# Patient Record
Sex: FEMALE | Race: BLACK OR AFRICAN AMERICAN | Employment: FULL TIME | URBAN - METROPOLITAN AREA
[De-identification: names, ages, dates, MRNs, and addresses within clinical notes are randomized per-mention and may not be internally consistent; named-entity substitution may affect disease eponyms.]

---

## 2017-02-03 ENCOUNTER — DOCUMENTATION ONLY (OUTPATIENT)
Dept: RHEUMATOLOGY | Age: 57
End: 2017-02-03

## 2017-02-03 ENCOUNTER — TELEPHONE (OUTPATIENT)
Dept: RHEUMATOLOGY | Age: 57
End: 2017-02-03

## 2017-02-03 NOTE — TELEPHONE ENCOUNTER
Returned call to patient who has been c/o right hip pain x's 1  week. The patient is using diclofenac gel and [employer] with no little relief. Last office visit: 11/11/16.

## 2017-02-06 ENCOUNTER — OFFICE VISIT (OUTPATIENT)
Dept: RHEUMATOLOGY | Age: 57
End: 2017-02-06

## 2017-02-06 VITALS
TEMPERATURE: 98.3 F | BODY MASS INDEX: 36 KG/M2 | RESPIRATION RATE: 16 BRPM | DIASTOLIC BLOOD PRESSURE: 86 MMHG | OXYGEN SATURATION: 98 % | HEART RATE: 63 BPM | SYSTOLIC BLOOD PRESSURE: 140 MMHG | HEIGHT: 67 IN | WEIGHT: 229.4 LBS

## 2017-02-06 DIAGNOSIS — Z79.1 LONG TERM (CURRENT) USE OF NON-STEROIDAL ANTI-INFLAMMATORIES (NSAID): ICD-10-CM

## 2017-02-06 DIAGNOSIS — M16.11 PRIMARY OSTEOARTHRITIS OF RIGHT HIP: Primary | ICD-10-CM

## 2017-02-06 RX ORDER — SULINDAC 200 MG/1
TABLET ORAL
Qty: 60 TAB | Refills: 2 | Status: SHIPPED | OUTPATIENT
Start: 2017-02-06 | End: 2017-08-16 | Stop reason: ALTCHOICE

## 2017-02-06 NOTE — PROGRESS NOTES
HISTORY OF PRESENT ILLNESS  Mick Singh is a 55 y.o. female. HPI Patient presents for evaluation of hip pain. She notes that right hip pain is worsening. She did see Dr. Shanice Donohue in December, and he recommended hip replacement. She had seen a chiropractor for topical \"laser therapy:\" 2 visits may have helped, though she wants more of a \"permanent solution. \" She is scheduled to see physicians regarding \"stem cell therapy\" on February 28th. She notes posterior, right hip region pain, now even bothering her at night. She has AM stiffness of 2 hours. She relates pain increases with walking (perhaps 1/2 mile with using a cane). She takes diclofenac 75 mg twice daily with some relief, if taking BC powders TID. An OTC TENS unit gives some relief. Current Outpatient Prescriptions   Medication Sig Dispense Refill    diclofenac 75 mg take 75 mg BID as needed for pain 60 Tab 2    lidocaine (LIDODERM) 5 % by TransDERmal route every twenty-four (24) hours. Apply patch to the affected area for 12 hours a day and remove for 12 hours a day.  diclofenac (VOLTAREN) 1 % gel Apply  to affected area four (4) times daily.  Cetirizine (ZYRTEC) 10 mg cap Take  by mouth.  GINGER ROOT (GINGER, ZINGIBER OFFICINALIS,) 550 mg cap Take  by mouth.  LACTOBACILLUS ACIDOPHILUS (PROBIOTIC PO) Take  by mouth.  glucosamine-chondroitin (ARTHX) 500-400 mg cap Take 1 Cap by mouth daily.  cholecalciferol, VITAMIN D3, (VITAMIN D3) 5,000 unit tab tablet Take  by mouth daily.  FLAXSEED OIL PO Take  by mouth.  CALCIUM PO Take 500 mg by mouth daily.  albuterol (PROVENTIL HFA, VENTOLIN HFA, PROAIR HFA) 90 mcg/actuation inhaler Take 2 Puffs by inhalation every four (4) hours as needed for Wheezing. Indications: Acute Asthma Attack 1 Inhaler 12    azelastine-fluticasone 137-50 mcg/spray spry by Nasal route. Review of Systems   Constitutional: Negative for fever.    Cardiovascular: Negative for leg swelling. Gastrointestinal: Negative for abdominal pain and nausea. Musculoskeletal: Positive for joint pain. Negative for falls. Skin: Negative for rash. Physical Exam   Vitals reviewed. Constitutional: She is oriented to person, place, and time. She appears well-developed and well-nourished. No distress. Cardiovascular:   no edema   Abdominal: Soft. She exhibits no distension. There is no tenderness. Musculoskeletal:   -right hip severely limited and painful IR  -left hip and knees FROM  -No dorsal lumbar spine or paraspinal tenderness  Neurological: She is alert and oriented to person, place, and time. She exhibits normal muscle tone.   -gait limping, favoring right leg  Skin: Skin is warm and dry. Psychiatric: She has a normal mood and affect. Thought content normal.     ASSESSMENT and PLAN    ICD-10-CM ICD-9-CM    1. Primary osteoarthritis of right hip: worsening hip pain over about 3 years. Significant OA on radiograph. Walking progressively limited by pain. She is waking up at night now due to pain. Diclofenac of limited benefit. She has seen Dr. Jasmine Buchanan, who had recommended hip replacement. M16.11 715.15 -she is very leery of hip replacement. We discussed her concerns. We discussed the importance of finding a therapy that allowed for improvement in functioning and decreased pain.  -regarding upcoming PRPP/stem cell therapy appointment, I asked that she forward information from the appointment and any available data for my review  -TENS has been helpful for some of pain. She will check with PT to see if another visit is needed to evaluate response for pain and make recommendations (I can subsequently order TENS if found helpful for discomfort).  Consider evaluating gait with PT as well  -sulindac 200 mg BID as needed instead of diclofenac or other NSAIDS  -after upcoming evaluation, consider hip injection if pain not controlled (and no pending surgery)  -discussed trial of medical food Limbrel 500 mg BID. Discussed limited data regarding efficacy. Discussed potential adverse effects. If no improvement after 4 week trial, stop taking this. 2. Long term (current) use of non-steroidal anti-inflammatories (nsaid) Z79.1 V58.64 -reviewed potential adverse effects  -omeprazole 20 mg daily if regular NSAIDS  -CBC and CMP 4-6 weeks after sulindac (and Limbrel, if added)     A total 30+ minutes was spent with the patient of which greater than 50% of the time was spent counseling/ coordinating care regarding current hip pain, concerns about surgery, reviewing other options for relief of pain; discussing upcoming evaluation for PRPP/ stem cell therapy.

## 2017-02-06 NOTE — MR AVS SNAPSHOT
Visit Information Date & Time Provider Department Dept. Phone Encounter #  
 2/6/2017  2:00 PM Maria E Abad MD [facility] [id number] Follow-up Instructions Return in about 6 weeks (around 3/20/2017). Upcoming Health Maintenance Date Due Pneumococcal 19-64 Medium Risk (1 of 1 - PPSV23) 10/1/1980 DTaP/Tdap/Td series (1 - Tdap) 10/1/1982 FOBT Q 1 YEAR AGE 50-75 10/1/2011 BREAST CANCER SCRN MAMMOGRAM 12/13/2018 PAP AKA CERVICAL CYTOLOGY 11/18/2019 Allergies as of 2/6/2017  Review Complete On: 2/6/2017 By: Maria E Abad MD  
  
 Severity Noted Reaction Type Reactions Codeine  04/04/2016    Nausea Only Shellfish Derived  04/04/2016    Unknown (comments) Throat swells a little Current Immunizations  Reviewed on 2/6/2017 Name Date Influenza Vaccine Ahmet Rodney) 10/17/2016 Reviewed by Bienvenido Perrin LPN on 5/4/3790 at  0:44 PM  
 Reviewed by Bienvenido Perrin LPN on 9/6/8820 at  5:48 PM  
You Were Diagnosed With   
  
 Codes Comments Primary osteoarthritis of right hip    -  Primary ICD-10-CM: M16.11 
ICD-9-CM: 715.15 Long term (current) use of non-steroidal anti-inflammatories (nsaid)     ICD-10-CM: Z79.1 ICD-9-CM: V58.64 Vitals BP Pulse Temp Resp Height(growth percentile) Weight(growth percentile) 140/86 (BP 1 Location: Right arm, BP Patient Position: Sitting) 63 98.3 °F (36.8 °C) (Oral) 16 5' 6.5\" (1.689 m) 229 lb 6.4 oz (104.1 kg) SpO2 BMI OB Status Smoking Status 98% 36.47 kg/m2 Menopause Never Smoker BMI and BSA Data Body Mass Index Body Surface Area  
 36.47 kg/m 2 2.21 m 2 Preferred Pharmacy Pharmacy Name Phone [facility], [address] [place] [address]. [phone] Your Updated Medication List  
  
   
This list is accurate as of: 2/6/17  3:22 PM.  Always use your most recent med list.  
  
  
  
  
 albuterol 90 mcg/actuation inhaler Commonly known as:  PROVENTIL HFA, VENTOLIN HFA, PROAIR HFA Take 2 Puffs by inhalation every four (4) hours as needed for Wheezing. Indications: Acute Asthma Attack  
  
 azelastine-fluticasone 137-50 mcg/spray [place]  
by Nasal route. baicalin-catechin 500 mg Cap Commonly known as:  Elgie [place] Take 500 mg by mouth two (2) times a day. CALCIUM PO Take 500 mg by mouth daily. cholecalciferol (VITAMIN D3) 5,000 unit Tab tablet Commonly known as:  VITAMIN D3 Take  by mouth daily. FLAXSEED OIL PO Take  by mouth.  
  
 ginger (Zingiber officinalis) 550 mg Cap Take  by mouth. glucosamine-chondroitin 500-400 mg Cap Commonly known as:  [address] [place] [address] Take 1 Cap by mouth daily. lidocaine 5 % Commonly known as:  LIDODERM  
by TransDERmal route every twenty-four (24) hours. Apply patch to the affected area for 12 hours a day and remove for 12 hours a day. PROBIOTIC PO Take  by mouth.  
  
 sulindac 200 mg tablet Commonly known as:  CLINORIL Instead of diclofenac or other NSAIDS; take 200 mg BID as needed for pain VOLTAREN 1 % Gel Generic drug:  diclofenac Apply  to affected area four (4) times daily. ZyrTEC 10 mg Cap Generic drug:  Cetirizine Take  by mouth. Prescriptions Sent to Pharmacy Refills  
 sulindac (CLINORIL) 200 mg tablet 2 Sig: Instead of diclofenac or other NSAIDS; take 200 mg BID as needed for pain  
 Class: Normal  
 Pharmacy: [address] Ph #: [phone]  
 baicalin-catechin (LIMBREL) 500 mg cap 5 Sig: Take 500 mg by mouth two (2) times a day. Class: Normal  
 Pharmacy: [address] Ph #: [phone] Route: Oral  
  
We Performed the Following REFERRAL TO PHYSICAL THERAPY [WYI85 Custom] Comments: Assist with hip pain secondary to OA and abnormal gait. TENS if needed (please provide  Information regarding purchase of TENS if helpful) Follow-up Instructions Return in about 6 weeks (around 3/20/2017). Referral Information Referral ID Referred By Referred To  
  
 [id number] Yenny Dawson Not Available Visits Status Start Date End Date 12 New Request 2/6/17 2/6/18 If your referral has a status of pending review or denied, additional information will be sent to support the outcome of this decision. Patient Instructions Sulindac twice daily if needed for pain (not with diclofenac or BC's) Omeprazole 20 mg daily for stomach if persistent sulindac use Limbrel twice daily for 4 weeks (stop if no better) Introducing [facility] SERVICES! Rolo Crowley introduces [employer] patient portal. Now you can access parts of your medical record, email your doctor's office, and request medication refills online. 1. In your internet browser, go to [email or web address] 2. Click on the First Time User? Click Here link in the Sign In box. You will see the New Member Sign Up page. 3. Enter your Mahalo Access Code exactly as it appears below. You will not need to use this code after youve completed the sign-up process. If you do not sign up before the expiration date, you must request a new code. · Mahalo Access Code: [email or web address] Expires: 2/9/2017 10:13 AM 
 
4. Enter the last four digits of your Social Security Number (xxxx) and Date of Birth (mm/dd/yyyy) as indicated and click Submit. You will be taken to the next sign-up page. 5. Create a Mahalo ID. This will be your Mahalo login ID and cannot be changed, so think of one that is secure and easy to remember. 6. Create a Mahalo password. You can change your password at any time. 7. Enter your Password Reset Question and Answer. This can be used at a later time if you forget your password. 8. Enter your e-mail address. You will receive e-mail notification when new information is available in [address] 9. Click Sign Up. You can now view and download portions of your medical record. 10. Click the Download Summary menu link to download a portable copy of your medical information. If you have questions, please visit the Frequently Asked Questions section of the [employer] website. Remember, [employer] is NOT to be used for urgent needs. For medical emergencies, dial 911. Now available from your iPhone and Android! Please provide this summary of care documentation to your next provider. Your primary care clinician is listed as Nasrin Sanchez. If you have any questions after today's visit, please call [phone].

## 2017-02-06 NOTE — PATIENT INSTRUCTIONS
Sulindac twice daily if needed for pain (not with diclofenac or BC's)    Omeprazole 20 mg daily for stomach if persistent sulindac use  Limbrel twice daily for 4 weeks (stop if no better)

## 2017-02-17 ENCOUNTER — OFFICE VISIT (OUTPATIENT)
Dept: FAMILY MEDICINE CLINIC | Age: 57
End: 2017-02-17

## 2017-02-17 VITALS
DIASTOLIC BLOOD PRESSURE: 80 MMHG | BODY MASS INDEX: 35.58 KG/M2 | SYSTOLIC BLOOD PRESSURE: 122 MMHG | RESPIRATION RATE: 20 BRPM | HEART RATE: 77 BPM | HEIGHT: 67 IN | TEMPERATURE: 97.5 F | OXYGEN SATURATION: 96 % | WEIGHT: 226.7 LBS

## 2017-02-17 DIAGNOSIS — N64.89 BREAST ASYMMETRY: Primary | ICD-10-CM

## 2017-02-17 NOTE — PATIENT INSTRUCTIONS
Breast Self-Exam: Care Instructions  Your Care Instructions  A breast self-exam is when you check your breasts for lumps or changes. This regular exam helps you learn how your breasts normally look and feel. Most breast problems or changes are not because of cancer. Breast self-exam is not a substitute for a mammogram. Having regular breast exams by your doctor and regular mammograms improve your chances of finding any problems with your breasts. Some women set a time each month to do a step-by-step breast self-exam. Other women like a less formal system. They might look at their breasts as they brush their teeth, or feel their breasts once in a while in the shower. If you notice a change in your breast, tell your doctor. Follow-up care is a key part of your treatment and safety. Be sure to make and go to all appointments, and call your doctor if you are having problems. Its also a good idea to know your test results and keep a list of the medicines you take. How do you do a breast self-exam?  · The best time to examine your breasts is usually one week after your menstrual period begins. Your breasts should not be tender then. If you do not have periods, you might do your exam on a day of the month that is easy to remember. · To examine your breasts:  ¨ Remove all your clothes above the waist and lie down. When you are lying down, your breast tissue spreads evenly over your chest wall, which makes it easier to feel all your breast tissue. ¨ Use the padsnot the fingertipsof the 3 middle fingers of your left hand to check your right breast. Move your fingers slowly in small coin-sized circles that overlap. ¨ Use three levels of pressure to feel of all your breast tissue. Use light pressure to feel the tissue close to the skin surface. Use medium pressure to feel a little deeper. Use firm pressure to feel your tissue close to your breastbone and ribs.  Use each pressure level to feel your breast tissue before moving on to the next spot. ¨ Check your entire breast, moving up and down as if following a strip from the collarbone to the bra line, and from the armpit to the ribs. Repeat until you have covered the entire breast.  ¨ Repeat this procedure for your left breast, using the pads of the 3 middle fingers of your right hand. · To examine your breasts while in the shower:  ¨ Place one arm over your head and lightly soap your breast on that side. ¨ Using the pads of your fingers, gently move your hand over your breast (in the strip pattern described above), feeling carefully for any lumps or changes. ¨ Repeat for the other breast.  · Have your doctor inspect anything you notice to see if you need further testing. Where can you learn more? Go to http://wen-clau.info/. Enter P148 in the search box to learn more about \"Breast Self-Exam: Care Instructions. \"  Current as of: July 26, 2016  Content Version: 11.1  © 9191-6246 [employer]. Care instructions adapted under license by [employer] (which disclaims liability or warranty for this information). If you have questions about a medical condition or this instruction, always ask your healthcare professional. [place] [address] any warranty or liability for your use of this information.

## 2017-02-17 NOTE — PROGRESS NOTES
1. Have you been to the ER, urgent care clinic since your last visit? Hospitalized since your last visit? No    2. Have you seen or consulted any other health care providers outside of the [address] [place] [address] since your last visit? Include any pap smears or colon screening.  No     Pt states she is here due to swelling of her left breast

## 2017-02-17 NOTE — PROGRESS NOTES
Chief Complaint   Patient presents with    Other     swollen left breast     HPI:  Shabnam Garcia is a 55 y.o. AA female presenting with complaints of swollen left breast.  Patient noted left breast was larger then right, it does not hurt, it is not red/warm. Denies fever/chills. Mammogram in 12/2016 was normal    Review of Systems  As per hpi    Past Medical History   Diagnosis Date    Anemia     Arthritis      Past Surgical History   Procedure Laterality Date    Hx refractive surgery  2006     Social History     Social History    Marital status: SINGLE     Spouse name: N/A    Number of children: N/A    Years of education: N/A     Social History Main Topics    Smoking status: Never Smoker    Smokeless tobacco: Never Used    Alcohol use 0.6 oz/week     0 Standard drinks or equivalent, 1 Glasses of wine per week    Drug use: No    Sexual activity: No     Other Topics Concern    None     Social History Narrative     Current Outpatient Prescriptions   Medication Sig Dispense Refill    sulindac (CLINORIL) 200 mg tablet Instead of diclofenac or other NSAIDS; take 200 mg BID as needed for pain 60 Tab 2    albuterol (PROVENTIL HFA, VENTOLIN HFA, PROAIR HFA) 90 mcg/actuation inhaler Take 2 Puffs by inhalation every four (4) hours as needed for Wheezing. Indications: Acute Asthma Attack 1 Inhaler 12    lidocaine (LIDODERM) 5 % by TransDERmal route every twenty-four (24) hours. Apply patch to the affected area for 12 hours a day and remove for 12 hours a day.  Cetirizine (ZYRTEC) 10 mg cap Take  by mouth.  GINGER ROOT (GINGER, ZINGIBER OFFICINALIS,) 550 mg cap Take  by mouth.  LACTOBACILLUS ACIDOPHILUS (PROBIOTIC PO) Take  by mouth.  glucosamine-chondroitin (ARTHX) 500-400 mg cap Take 1 Cap by mouth daily.  cholecalciferol, VITAMIN D3, (VITAMIN D3) 5,000 unit tab tablet Take  by mouth daily.  FLAXSEED OIL PO Take  by mouth.  CALCIUM PO Take 500 mg by mouth daily. Allergies   Allergen Reactions    Codeine Nausea Only    Shellfish Derived Unknown (comments)     Throat swells a little     Objective:  Visit Vitals    /80 (BP 1 Location: Right arm, BP Patient Position: Sitting)    Pulse 77    Temp 97.5 °F (36.4 °C) (Oral)    Resp 20    Ht 5' 6.5\" (1.689 m)    Wt 226 lb 11.2 oz (102.8 kg)    SpO2 96%    BMI 36.04 kg/m2     Physical Exam:   General appearance - alert, well appearing, in no distress  Mental status - alert, oriented to person, place, and time  EYE-PERRL, EOMI  Neck - supple, no significant adenopathy   Chest - clear to auscultation, no wheezes, rales or rhonchi   Heart - normal rate, regular rhythm, normal blood pressure   Abdomen - soft, nontender, nondistended, no organomegaly  Ext-peripheral pulses normal, no pedal edema  Neuro -alert, oriented, normal speech, no focal findings   Breasts: symmetric fibrous changes in both upper outer quadrants, non tender, no obvious swelling. Results for orders placed or performed in visit on 42/08/86   METABOLIC PANEL, COMPREHENSIVE   Result Value Ref Range    Glucose 84 65 - 99 mg/dL    BUN 15 6 - 24 mg/dL    Creatinine 0.91 0.57 - 1.00 mg/dL    GFR est non-AA 71 >59 mL/min/1.73    GFR est AA 82 >59 mL/min/1.73    BUN/Creatinine ratio 16 9 - 23    Sodium 141 136 - 144 mmol/L    Potassium 4.5 3.5 - 5.2 mmol/L    Chloride 101 97 - 106 mmol/L    CO2 22 18 - 29 mmol/L    Calcium 9.8 8.7 - 10.2 mg/dL    Protein, total 7.0 6.0 - 8.5 g/dL    Albumin 4.3 3.5 - 5.5 g/dL    GLOBULIN, TOTAL 2.7 1.5 - 4.5 g/dL    A-G Ratio 1.6 1.1 - 2.5    Bilirubin, total 0.4 0.0 - 1.2 mg/dL    Alk.  phosphatase 77 39 - 117 IU/L    AST (SGOT) 17 0 - 40 IU/L    ALT (SGPT) 12 0 - 32 IU/L   LIPID PANEL   Result Value Ref Range    Cholesterol, total 195 100 - 199 mg/dL    Triglyceride 85 0 - 149 mg/dL    HDL Cholesterol 53 >39 mg/dL    VLDL, calculated 17 5 - 40 mg/dL    LDL, calculated 125 (H) 0 - 99 mg/dL   HEPATITIS PANEL, ACUTE Result Value Ref Range    Hepatitis A Ab, IgM Negative Negative    Hep B surface Ag screen Negative Negative    Hep B Core Ab, IgM Negative Negative    Hep C Virus Ab 0.1 0.0 - 0.9 s/co ratio   HEMOGLOBIN A1C   Result Value Ref Range    Hemoglobin A1c 5.9 (H) 4.8 - 5.6 %    Estimated average glucose 123 mg/dL   AMB POC URINALYSIS DIP STICK AUTO W/O MICRO   Result Value Ref Range    Color (UA POC) Yellow     Clarity (UA POC) Clear     Glucose (UA POC) Negative Negative    Bilirubin (UA POC) Negative Negative    Ketones (UA POC) Negative Negative    Specific gravity (UA POC) 1.015 1.001 - 1.035    Blood (UA POC) Trace Negative    pH (UA POC) 7.0 4.6 - 8.0    Protein (UA POC) Negative Negative mg/dL    Urobilinogen (UA POC) 0.2 mg/dL 0.2 - 1    Nitrites (UA POC) Negative Negative    Leukocyte esterase (UA POC) Negative Negative     Assessment/Plan:    ICD-10-CM ICD-9-CM    1. Breast asymmetry N64.89 611.89      Patient Instructions        Breast Self-Exam: Care Instructions  Your Care Instructions  A breast self-exam is when you check your breasts for lumps or changes. This regular exam helps you learn how your breasts normally look and feel. Most breast problems or changes are not because of cancer. Breast self-exam is not a substitute for a mammogram. Having regular breast exams by your doctor and regular mammograms improve your chances of finding any problems with your breasts. Some women set a time each month to do a step-by-step breast self-exam. Other women like a less formal system. They might look at their breasts as they brush their teeth, or feel their breasts once in a while in the shower. If you notice a change in your breast, tell your doctor. Follow-up care is a key part of your treatment and safety. Be sure to make and go to all appointments, and call your doctor if you are having problems. Its also a good idea to know your test results and keep a list of the medicines you take.   How do you do a breast self-exam?  · The best time to examine your breasts is usually one week after your menstrual period begins. Your breasts should not be tender then. If you do not have periods, you might do your exam on a day of the month that is easy to remember. · To examine your breasts:  ¨ Remove all your clothes above the waist and lie down. When you are lying down, your breast tissue spreads evenly over your chest wall, which makes it easier to feel all your breast tissue. ¨ Use the padsnot the fingertipsof the 3 middle fingers of your left hand to check your right breast. Move your fingers slowly in small coin-sized circles that overlap. ¨ Use three levels of pressure to feel of all your breast tissue. Use light pressure to feel the tissue close to the skin surface. Use medium pressure to feel a little deeper. Use firm pressure to feel your tissue close to your breastbone and ribs. Use each pressure level to feel your breast tissue before moving on to the next spot. ¨ Check your entire breast, moving up and down as if following a strip from the collarbone to the bra line, and from the armpit to the ribs. Repeat until you have covered the entire breast.  ¨ Repeat this procedure for your left breast, using the pads of the 3 middle fingers of your right hand. · To examine your breasts while in the shower:  ¨ Place one arm over your head and lightly soap your breast on that side. ¨ Using the pads of your fingers, gently move your hand over your breast (in the strip pattern described above), feeling carefully for any lumps or changes. ¨ Repeat for the other breast.  · Have your doctor inspect anything you notice to see if you need further testing. Where can you learn more? Go to http://wen-clau.info/. Enter P148 in the search box to learn more about \"Breast Self-Exam: Care Instructions. \"  Current as of: July 26, 2016  Content Version: 11.1  © 5587-2382 [employer], [facility].  Care instructions adapted under license by [employer] (which disclaims liability or warranty for this information). If you have questions about a medical condition or this instruction, always ask your healthcare professional. [address] any warranty or liability for your use of this information. Follow-up Disposition:  Return in about 3 months (around 5/17/2017) for keep your appointment.

## 2017-04-04 ENCOUNTER — TELEPHONE (OUTPATIENT)
Dept: FAMILY MEDICINE CLINIC | Age: 57
End: 2017-04-04

## 2017-04-04 NOTE — TELEPHONE ENCOUNTER
[facility] Preregistration -    Would like latest OV & labs   Upcoming surgery       Best number to reach Yumiko Phan at [phone]  Fax  [phone]

## 2017-04-05 ENCOUNTER — OFFICE VISIT (OUTPATIENT)
Dept: FAMILY MEDICINE CLINIC | Age: 57
End: 2017-04-05

## 2017-04-05 VITALS
OXYGEN SATURATION: 97 % | TEMPERATURE: 96.9 F | HEIGHT: 66 IN | RESPIRATION RATE: 18 BRPM | DIASTOLIC BLOOD PRESSURE: 67 MMHG | SYSTOLIC BLOOD PRESSURE: 121 MMHG | WEIGHT: 227.8 LBS | HEART RATE: 77 BPM | BODY MASS INDEX: 36.61 KG/M2

## 2017-04-05 DIAGNOSIS — R73.02 IGT (IMPAIRED GLUCOSE TOLERANCE): ICD-10-CM

## 2017-04-05 DIAGNOSIS — Z01.818 PRE-OP EXAM: ICD-10-CM

## 2017-04-05 DIAGNOSIS — E78.5 DYSLIPIDEMIA: Primary | ICD-10-CM

## 2017-04-05 NOTE — PROGRESS NOTES
1. Have you been to the ER, urgent care clinic since your last visit? Hospitalized since your last visit? No    2. Have you seen or consulted any other health care providers outside of the [address] [place] [address] since your last visit? Include any pap smears or colon screening.  No     Pt states she is here for pre-op exam for hip surgery

## 2017-04-05 NOTE — PROGRESS NOTES
Chief Complaint   Patient presents with    Pre-op Exam     hip replacement surgery     HPI:  Cole Niece is a 55 y.o. AA female with borderline diabetes, dyslipidemia, Asthma presents for pre-op evaluation. Patient is sheduled for right replacement surgery at [facility] with Dr. Elisabet Miller on 4/13/17. Asthma is stable without recent exaerbation. Except for pain medication she takes no other prescription medication. She has no complaints. Blood work is being been done at her surgeon's office. Review of Systems  Pertinent are items are noted in hpi    Past Medical History:   Diagnosis Date    Anemia     Arthritis      Past Surgical History:   Procedure Laterality Date    HX REFRACTIVE SURGERY  2006     Social History     Social History    Marital status: SINGLE     Spouse name: N/A    Number of children: N/A    Years of education: N/A     Social History Main Topics    Smoking status: Never Smoker    Smokeless tobacco: Never Used    Alcohol use 0.6 oz/week     0 Standard drinks or equivalent, 1 Glasses of wine per week    Drug use: No    Sexual activity: No     Other Topics Concern    None     Social History Narrative     Current Outpatient Prescriptions   Medication Sig Dispense Refill    albuterol (PROVENTIL HFA, VENTOLIN HFA, PROAIR HFA) 90 mcg/actuation inhaler Take 2 Puffs by inhalation every four (4) hours as needed for Wheezing. Indications: Acute Asthma Attack 1 Inhaler 12    Cetirizine (ZYRTEC) 10 mg cap Take  by mouth.  GINGER ROOT (GINGER, ZINGIBER OFFICINALIS,) 550 mg cap Take  by mouth.  glucosamine-chondroitin (ARTHX) 500-400 mg cap Take 1 Cap by mouth daily.  cholecalciferol, VITAMIN D3, (VITAMIN D3) 5,000 unit tab tablet Take  by mouth daily.  FLAXSEED OIL PO Take  by mouth.  CALCIUM PO Take 500 mg by mouth daily.       sulindac (CLINORIL) 200 mg tablet Instead of diclofenac or other NSAIDS; take 200 mg BID as needed for pain 60 Tab 2  LACTOBACILLUS ACIDOPHILUS (PROBIOTIC PO) Take  by mouth. Allergies   Allergen Reactions    Codeine Nausea Only    Shellfish Derived Unknown (comments)     Throat swells a little     Objective:  Visit Vitals    /67 (BP 1 Location: Right arm, BP Patient Position: Sitting)    Pulse 77    Temp 96.9 °F (36.1 °C)    Resp 18    Ht 5' 5.5\" (1.664 m)    Wt 227 lb 12.8 oz (103.3 kg)    SpO2 97%    BMI 37.33 kg/m2     Physical Exam:   General appearance - alert, walks with yasmeen, in no apparent distress  Mental status - alert, oriented to person, place, and time  EYE-PERRL, EOMI  ENT-ENT exam normal, no neck nodes or sinus tenderness  Nose - not congested   Neck - supple, no thyromegaly,no significant adenopathy   Chest - clear to auscultation, no wheezes, rales or rhonchi   Heart - normal rate, regular rhythm, normal blood pressure  Abdomen - soft, nontender, nondistended, no organomegaly  Ext-peripheral pulses normal, no pedal edema  Neuro -alert, oriented, normal speech, no focal findings  Back-full range of motion, no tenderness, palpable spasm or pain on motion   Right hip-antalgic gait, reduced range of motion     Results for orders placed or performed in visit on 50/15/35   METABOLIC PANEL, COMPREHENSIVE   Result Value Ref Range    Glucose 84 65 - 99 mg/dL    BUN 15 6 - 24 mg/dL    Creatinine 0.91 0.57 - 1.00 mg/dL    GFR est non-AA 71 >59 mL/min/1.73    GFR est AA 82 >59 mL/min/1.73    BUN/Creatinine ratio 16 9 - 23    Sodium 141 136 - 144 mmol/L    Potassium 4.5 3.5 - 5.2 mmol/L    Chloride 101 97 - 106 mmol/L    CO2 22 18 - 29 mmol/L    Calcium 9.8 8.7 - 10.2 mg/dL    Protein, total 7.0 6.0 - 8.5 g/dL    Albumin 4.3 3.5 - 5.5 g/dL    GLOBULIN, TOTAL 2.7 1.5 - 4.5 g/dL    A-G Ratio 1.6 1.1 - 2.5    Bilirubin, total 0.4 0.0 - 1.2 mg/dL    Alk.  phosphatase 77 39 - 117 IU/L    AST (SGOT) 17 0 - 40 IU/L    ALT (SGPT) 12 0 - 32 IU/L   LIPID PANEL   Result Value Ref Range    Cholesterol, total 195 100 - 199 mg/dL    Triglyceride 85 0 - 149 mg/dL    HDL Cholesterol 53 >39 mg/dL    VLDL, calculated 17 5 - 40 mg/dL    LDL, calculated 125 (H) 0 - 99 mg/dL   HEPATITIS PANEL, ACUTE   Result Value Ref Range    Hepatitis A Ab, IgM Negative Negative    Hep B surface Ag screen Negative Negative    Hep B Core Ab, IgM Negative Negative    Hep C Virus Ab 0.1 0.0 - 0.9 s/co ratio   HEMOGLOBIN A1C   Result Value Ref Range    Hemoglobin A1c 5.9 (H) 4.8 - 5.6 %    Estimated average glucose 123 mg/dL   AMB POC URINALYSIS DIP STICK AUTO W/O MICRO   Result Value Ref Range    Color (UA POC) Yellow     Clarity (UA POC) Clear     Glucose (UA POC) Negative Negative    Bilirubin (UA POC) Negative Negative    Ketones (UA POC) Negative Negative    Specific gravity (UA POC) 1.015 1.001 - 1.035    Blood (UA POC) Trace Negative    pH (UA POC) 7.0 4.6 - 8.0    Protein (UA POC) Negative Negative mg/dL    Urobilinogen (UA POC) 0.2 mg/dL 0.2 - 1    Nitrites (UA POC) Negative Negative    Leukocyte esterase (UA POC) Negative Negative     12 lead EKG shows normal sinus rhythm. Assessment/Plan:    ICD-10-CM ICD-9-CM    1. Dyslipidemia E78.5 272.4    2. Pre-op exam Z01.818 V72.84 AMB POC EKG ROUTINE W/ 12 LEADS, INTER & REP   3. IGT (impaired glucose tolerance) R73.02 790.22      Patient Instructions   Based on physical exam and EKG reading , patient is stable for scheduled precedure    Follow-up Disposition:  Return in about 3 months (around 7/5/2017), or if symptoms worsen or fail to improve, for routine follow up.

## 2017-04-10 ENCOUNTER — TELEPHONE (OUTPATIENT)
Dept: FAMILY MEDICINE CLINIC | Age: 57
End: 2017-04-10

## 2017-04-10 NOTE — TELEPHONE ENCOUNTER
Spoke with Danielle Foster, informed her that patient's lab work need to be faxed over for Dr Walker Chilel to review in order to clear her for surgery.

## 2017-04-10 NOTE — TELEPHONE ENCOUNTER
[facility] office     Wants pre-op form and info from visit         [place] at [phone] or Fax [phone]

## 2017-08-15 ENCOUNTER — OFFICE VISIT (OUTPATIENT)
Dept: FAMILY MEDICINE CLINIC | Age: 57
End: 2017-08-15

## 2017-08-15 VITALS
WEIGHT: 206.8 LBS | SYSTOLIC BLOOD PRESSURE: 110 MMHG | HEART RATE: 63 BPM | OXYGEN SATURATION: 98 % | DIASTOLIC BLOOD PRESSURE: 63 MMHG | HEIGHT: 66 IN | TEMPERATURE: 96.9 F | RESPIRATION RATE: 16 BRPM | BODY MASS INDEX: 33.23 KG/M2

## 2017-08-15 DIAGNOSIS — E78.5 DYSLIPIDEMIA (HIGH LDL; LOW HDL): ICD-10-CM

## 2017-08-15 DIAGNOSIS — Z00.00 WELL WOMAN EXAM (NO GYNECOLOGICAL EXAM): Primary | ICD-10-CM

## 2017-08-15 DIAGNOSIS — R73.02 IGT (IMPAIRED GLUCOSE TOLERANCE): ICD-10-CM

## 2017-08-15 DIAGNOSIS — Z96.641 S/P HIP REPLACEMENT, RIGHT: ICD-10-CM

## 2017-08-15 DIAGNOSIS — E55.9 HYPOVITAMINOSIS D: ICD-10-CM

## 2017-08-15 RX ORDER — CYCLOBENZAPRINE HCL 10 MG
10 TABLET ORAL
Qty: 20 TAB | Refills: 0 | Status: SHIPPED | OUTPATIENT
Start: 2017-08-15 | End: 2017-12-08 | Stop reason: ALTCHOICE

## 2017-08-15 RX ORDER — ASPIRIN 81 MG/1
TABLET ORAL DAILY
COMMUNITY
End: 2017-12-08

## 2017-08-15 RX ORDER — GUAIFENESIN AND PHENYLEPHRINE HCL 400; 10 MG/1; MG/1
TABLET ORAL
COMMUNITY

## 2017-08-15 NOTE — PATIENT INSTRUCTIONS

## 2017-08-15 NOTE — PROGRESS NOTES
Chief Complaint   Patient presents with    Cholesterol Problem     HPI:  Rhea Benavides is a 55 y.o. AA female with hypercholesterolemia, low vit d level presents for routine follow up. Patient had right hip replacement in April. She has been doing well since. She walks 3 miles a day. Today she is c/o right sided lower back stiffness. This is chronic, started long before surgery but has gotten worse since. Denies swelling, fall, injury. She is due for annual physical exam.     Review of Systems  As per hpi    Past Medical History:   Diagnosis Date    Anemia     Arthritis      Past Surgical History:   Procedure Laterality Date    HX HIP REPLACEMENT Right 04/13/2017    HX ORTHOPAEDIC      HX REFRACTIVE SURGERY  2006     Social History    Marital status: SINGLE     Spouse name: N/A    Number of children: N/A    Years of education: N/A     Social History Main Topics    Smoking status: Never Smoker    Smokeless tobacco: Never Used    Alcohol use 0.6 oz/week     1 Glasses of wine, 0 Standard drinks or equivalent per week    Drug use: No    Sexual activity: No     Current Outpatient Prescriptions   Medication Sig Dispense Refill    turmeric root extract 500 mg cap Take  by mouth.  aspirin delayed-release 81 mg tablet Take  by mouth daily.  albuterol (PROVENTIL HFA, VENTOLIN HFA, PROAIR HFA) 90 mcg/actuation inhaler Take 2 Puffs by inhalation every four (4) hours as needed for Wheezing. Indications: Acute Asthma Attack 1 Inhaler 12    Cetirizine (ZYRTEC) 10 mg cap Take  by mouth.  GINGER ROOT (GINGER, ZINGIBER OFFICINALIS,) 550 mg cap Take  by mouth.  LACTOBACILLUS ACIDOPHILUS (PROBIOTIC PO) Take  by mouth.  cholecalciferol, VITAMIN D3, (VITAMIN D3) 5,000 unit tab tablet Take  by mouth daily.  FLAXSEED OIL PO Take  by mouth.  CALCIUM PO Take 500 mg by mouth daily.       sulindac (CLINORIL) 200 mg tablet Instead of diclofenac or other NSAIDS; take 200 mg BID as needed for pain 60 Tab 2     Allergies   Allergen Reactions    Codeine Nausea Only    Shellfish Derived Unknown (comments)     Throat swells a little     Objective:  Visit Vitals    /63 (BP 1 Location: Left arm, BP Patient Position: Sitting)    Pulse 63    Temp 96.9 °F (36.1 °C) (Oral)    Resp 16    Ht 5' 5.5\" (1.664 m)    Wt 206 lb 12.8 oz (93.8 kg)    SpO2 98%    BMI 33.89 kg/m2     Physical Exam:   General appearance - alert, well appearing, in no apparent distress  Mental status - alert, oriented to person, place, and time  EYE-PERRL, EOMI  Neck - supple, no significant adenopathy   Chest - clear to auscultation, no wheezes, rales or rhonchi  Heart - normal rate, regular rhythm, normal blood pressure   Abdomen - soft, nontender, nondistended, no organomegaly  Ext-peripheral pulses normal, no pedal edema  Neuro -alert, oriented, normal speech, no focal finding    Results for orders placed or performed in visit on 43/51/63   METABOLIC PANEL, COMPREHENSIVE   Result Value Ref Range    Glucose 84 65 - 99 mg/dL    BUN 15 6 - 24 mg/dL    Creatinine 0.91 0.57 - 1.00 mg/dL    GFR est non-AA 71 >59 mL/min/1.73    GFR est AA 82 >59 mL/min/1.73    BUN/Creatinine ratio 16 9 - 23    Sodium 141 136 - 144 mmol/L    Potassium 4.5 3.5 - 5.2 mmol/L    Chloride 101 97 - 106 mmol/L    CO2 22 18 - 29 mmol/L    Calcium 9.8 8.7 - 10.2 mg/dL    Protein, total 7.0 6.0 - 8.5 g/dL    Albumin 4.3 3.5 - 5.5 g/dL    GLOBULIN, TOTAL 2.7 1.5 - 4.5 g/dL    A-G Ratio 1.6 1.1 - 2.5    Bilirubin, total 0.4 0.0 - 1.2 mg/dL    Alk.  phosphatase 77 39 - 117 IU/L    AST (SGOT) 17 0 - 40 IU/L    ALT (SGPT) 12 0 - 32 IU/L   LIPID PANEL   Result Value Ref Range    Cholesterol, total 195 100 - 199 mg/dL    Triglyceride 85 0 - 149 mg/dL    HDL Cholesterol 53 >39 mg/dL    VLDL, calculated 17 5 - 40 mg/dL    LDL, calculated 125 (H) 0 - 99 mg/dL   HEPATITIS PANEL, ACUTE   Result Value Ref Range    Hepatitis A Ab, IgM Negative Negative    Hep B surface Ag screen Negative Negative    Hep B Core Ab, IgM Negative Negative    Hep C Virus Ab 0.1 0.0 - 0.9 s/co ratio   HEMOGLOBIN A1C   Result Value Ref Range    Hemoglobin A1c 5.9 (H) 4.8 - 5.6 %    Estimated average glucose 123 mg/dL   AMB POC URINALYSIS DIP STICK AUTO W/O MICRO   Result Value Ref Range    Color (UA POC) Yellow     Clarity (UA POC) Clear     Glucose (UA POC) Negative Negative    Bilirubin (UA POC) Negative Negative    Ketones (UA POC) Negative Negative    Specific gravity (UA POC) 1.015 1.001 - 1.035    Blood (UA POC) Trace Negative    pH (UA POC) 7.0 4.6 - 8.0    Protein (UA POC) Negative Negative mg/dL    Urobilinogen (UA POC) 0.2 mg/dL 0.2 - 1    Nitrites (UA POC) Negative Negative    Leukocyte esterase (UA POC) Negative Negative     Assessment/Plan:    ICD-10-CM ICD-9-CM    1. Well woman exam (no gynecological exam) D77.02 G70.9 METABOLIC PANEL, COMPREHENSIVE      CBC WITH AUTOMATED DIFF      URINALYSIS W/ RFLX MICROSCOPIC   2. Radicular pain of right lower back M54.16 724.4 cyclobenzaprine (FLEXERIL) 10 mg tablet      TSH 3RD GENERATION   3. S/P hip replacement, right Z96.641 V43.64 TSH 3RD GENERATION   4. Dyslipidemia (high LDL; low HDL) E78.4 272.4 LIPID PANEL   5. IGT (impaired glucose tolerance) R73.02 790.22 HEMOGLOBIN A1C WITH EAG   6. Hypovitaminosis D E55.9 268.9 VITAMIN D, 25 HYDROXY     Patient Instructions        High Cholesterol: Care Instructions  Your Care Instructions  Cholesterol is a type of fat in your blood. It is needed for many body functions, such as making new cells. Cholesterol is made by your body. It also comes from food you eat. High cholesterol means that you have too much of the fat in your blood. This raises your risk of a heart attack and stroke. LDL and HDL are part of your total cholesterol. LDL is the \"bad\" cholesterol. High LDL can raise your risk for heart disease, heart attack, and stroke. HDL is the \"good\" cholesterol.  It helps clear bad cholesterol from the body. High HDL is linked with a lower risk of heart disease, heart attack, and stroke. Your cholesterol levels help your doctor find out your risk for having a heart attack or stroke. You and your doctor can talk about whether you need to lower your risk and what treatment is best for you. A heart-healthy lifestyle along with medicines can help lower your cholesterol and your risk. The way you choose to lower your risk will depend on how high your risk is for heart attack and stroke. It will also depend on how you feel about taking medicines. Follow-up care is a key part of your treatment and safety. Be sure to make and go to all appointments, and call your doctor if you are having problems. It's also a good idea to know your test results and keep a list of the medicines you take. How can you care for yourself at home? · Eat a variety of foods every day. Good choices include fruits, vegetables, whole grains (like oatmeal), dried beans and peas, nuts and seeds, soy products (like tofu), and fat-free or low-fat dairy products. · Replace butter, margarine, and hydrogenated or partially hydrogenated oils with olive and canola oils. (Canola oil margarine without trans fat is fine.)  · Replace red meat with fish, poultry, and soy protein (like tofu). · Limit processed and packaged foods like chips, crackers, and cookies. · Bake, broil, or steam foods. Don't mathis them. · Be physically active. Get at least 30 minutes of exercise on most days of the week. Walking is a good choice. You also may want to do other activities, such as running, swimming, cycling, or playing tennis or team sports. · Stay at a healthy weight or lose weight by making the changes in eating and physical activity listed above. Losing just a small amount of weight, even 5 to 10 pounds, can reduce your risk for having a heart attack or stroke. · Do not smoke. When should you call for help?   Watch closely for changes in your health, and be sure to contact your doctor if:  · You need help making lifestyle changes. · You have questions about your medicine. Where can you learn more? Go to http://wen-clau.info/. Enter Z702 in the search box to learn more about \"High Cholesterol: Care Instructions. \"  Current as of: April 3, 2017  Content Version: 11.3  © 1116-4148 [employer]. Care instructions adapted under license by [employer] (which disclaims liability or warranty for this information). If you have questions about a medical condition or this instruction, always ask your healthcare professional. [place] [address] any warranty or liability for your use of this information. Follow-up Disposition:  Return 2-3 weeks, for follow up results.

## 2017-08-15 NOTE — MR AVS SNAPSHOT
Visit Information Date & Time Provider Department Dept. Phone Encounter #  
 8/15/2017  8:00 AM Melvina Main MD [facility] at [address] [id number] Follow-up Instructions Return 2-3 weeks, for follow up results. Upcoming Health Maintenance Date Due DTaP/Tdap/Td series (1 - Tdap) 10/1/1982 FOBT Q 1 YEAR AGE 50-75 10/1/2011 INFLUENZA AGE 9 TO ADULT 8/1/2017 BREAST CANCER SCRN MAMMOGRAM 12/13/2018 PAP AKA CERVICAL CYTOLOGY 11/18/2019 Allergies as of 8/15/2017  Review Complete On: 8/15/2017 By: Melvina Main MD  
  
 Severity Noted Reaction Type Reactions Codeine  04/04/2016    Nausea Only Shellfish Derived  04/04/2016    Unknown (comments) Throat swells a little Current Immunizations  Reviewed on 2/6/2017 Name Date Influenza Vaccine Russ Kappa) 10/17/2016 Not reviewed this visit You Were Diagnosed With   
  
 Codes Comments Well woman exam (no gynecological exam)    -  Primary ICD-10-CM: Z00.00 ICD-9-CM: V70.0 Radicular pain of right lower back     ICD-10-CM: M54.16 
ICD-9-CM: 724.4 S/P hip replacement, right     ICD-10-CM: W62.541 ICD-9-CM: V43.64 Dyslipidemia (high LDL; low HDL)     ICD-10-CM: E78.4 ICD-9-CM: 272.4 IGT (impaired glucose tolerance)     ICD-10-CM: R73.02 
ICD-9-CM: 790.22 Hypovitaminosis D     ICD-10-CM: E55.9 ICD-9-CM: 268.9 Vitals BP Pulse Temp Resp Height(growth percentile) Weight(growth percentile) 110/63 (BP 1 Location: Left arm, BP Patient Position: Sitting) 63 96.9 °F (36.1 °C) (Oral) 16 5' 5.5\" (1.664 m) 206 lb 12.8 oz (93.8 kg) SpO2 BMI OB Status Smoking Status 98% 33.89 kg/m2 Menopause Never Smoker BMI and BSA Data Body Mass Index Body Surface Area  
 33.89 kg/m 2 2.08 m 2 Preferred Pharmacy Pharmacy Name Phone [facility] [address] [phone] Your Updated Medication List  
 This list is accurate as of: 8/15/17  9:05 AM.  Always use your most recent med list.  
  
  
  
  
 albuterol 90 mcg/actuation inhaler Commonly known as:  PROVENTIL HFA, VENTOLIN HFA, PROAIR HFA Take 2 Puffs by inhalation every four (4) hours as needed for Wheezing. Indications: Acute Asthma Attack  
  
 aspirin delayed-release 81 mg tablet Take  by mouth daily. CALCIUM PO Take 500 mg by mouth daily. cholecalciferol (VITAMIN D3) 5,000 unit Tab tablet Commonly known as:  VITAMIN D3 Take  by mouth daily. cyclobenzaprine 10 mg tablet Commonly known as:  FLEXERIL Take 1 Tab by mouth nightly. FLAXSEED OIL PO Take  by mouth.  
  
 ginger (Zingiber officinalis) 550 mg Cap Take  by mouth. PROBIOTIC PO Take  by mouth.  
  
 sulindac 200 mg tablet Commonly known as:  CLINORIL Instead of diclofenac or other NSAIDS; take 200 mg BID as needed for pain  
  
 turmeric root extract 500 mg Cap Take  by mouth. ZyrTEC 10 mg Cap Generic drug:  Cetirizine Take  by mouth. Prescriptions Sent to Pharmacy Refills  
 cyclobenzaprine (FLEXERIL) 10 mg tablet 0 Sig: Take 1 Tab by mouth nightly. Class: Normal  
 Pharmacy: [address].  #: [phone] Route: Oral  
  
We Performed the Following CBC WITH AUTOMATED DIFF [46672 CPT(R)] HEMOGLOBIN A1C WITH EAG [13095 CPT(R)] LIPID PANEL [98696 CPT(R)] METABOLIC PANEL, COMPREHENSIVE [18836 CPT(R)] TSH 3RD GENERATION [79373 CPT(R)] URINALYSIS W/ RFLX MICROSCOPIC [10720 CPT(R)] VITAMIN D, 25 HYDROXY [id number] CPT(R)] Follow-up Instructions Return 2-3 weeks, for follow up results. Patient Instructions High Cholesterol: Care Instructions Your Care Instructions Cholesterol is a type of fat in your blood.  It is needed for many body functions, such as making new cells. Cholesterol is made by your body. It also comes from food you eat. High cholesterol means that you have too much of the fat in your blood. This raises your risk of a heart attack and stroke. LDL and HDL are part of your total cholesterol. LDL is the \"bad\" cholesterol. High LDL can raise your risk for heart disease, heart attack, and stroke. HDL is the \"good\" cholesterol. It helps clear bad cholesterol from the body. High HDL is linked with a lower risk of heart disease, heart attack, and stroke. Your cholesterol levels help your doctor find out your risk for having a heart attack or stroke. You and your doctor can talk about whether you need to lower your risk and what treatment is best for you. A heart-healthy lifestyle along with medicines can help lower your cholesterol and your risk. The way you choose to lower your risk will depend on how high your risk is for heart attack and stroke. It will also depend on how you feel about taking medicines. Follow-up care is a key part of your treatment and safety. Be sure to make and go to all appointments, and call your doctor if you are having problems. It's also a good idea to know your test results and keep a list of the medicines you take. How can you care for yourself at home? · Eat a variety of foods every day. Good choices include fruits, vegetables, whole grains (like oatmeal), dried beans and peas, nuts and seeds, soy products (like tofu), and fat-free or low-fat dairy products. · Replace butter, margarine, and hydrogenated or partially hydrogenated oils with olive and canola oils. (Canola oil margarine without trans fat is fine.) · Replace red meat with fish, poultry, and soy protein (like tofu). · Limit processed and packaged foods like chips, crackers, and cookies. · Bake, broil, or steam foods. Don't mathis them. · Be physically active.  Get at least 30 minutes of exercise on most days of the week. Walking is a good choice. You also may want to do other activities, such as running, swimming, cycling, or playing tennis or team sports. · Stay at a healthy weight or lose weight by making the changes in eating and physical activity listed above. Losing just a small amount of weight, even 5 to 10 pounds, can reduce your risk for having a heart attack or stroke. · Do not smoke. When should you call for help? Watch closely for changes in your health, and be sure to contact your doctor if: 
· You need help making lifestyle changes. · You have questions about your medicine. Where can you learn more? Go to http://wenJumbletsclau.info/. Enter J884 in the search box to learn more about \"High Cholesterol: Care Instructions. \" Current as of: April 3, 2017 Content Version: 11.3 © 0714-5212 [employer]. Care instructions adapted under license by [employer] (which disclaims liability or warranty for this information). If you have questions about a medical condition or this instruction, always ask your healthcare professional. [place] [address] any warranty or liability for your use of this information. Introducing [facility]! [address] introduces [employer] patient portal. Now you can access parts of your medical record, email your doctor's office, and request medication refills online. 1. In your internet browser, go to [email or web address] 2. Click on the First Time User? Click Here link in the Sign In box. You will see the New Member Sign Up page. 3. Enter your [employer] Access Code exactly as it appears below. You will not need to use this code after youve completed the sign-up process. If you do not sign up before the expiration date, you must request a new code. · [employer] Access Code: [email or web address] Expires: 11/13/2017  9:05 AM 
 
 4. Enter the last four digits of your Social Security Number (xxxx) and Date of Birth (mm/dd/yyyy) as indicated and click Submit. You will be taken to the next sign-up page. 5. Create a Klinq ID. This will be your Klinq login ID and cannot be changed, so think of one that is secure and easy to remember. 6. Create a Klinq password. You can change your password at any time. 7. Enter your Password Reset Question and Answer. This can be used at a later time if you forget your password. 8. Enter your e-mail address. You will receive e-mail notification when new information is available in [address] 9. Click Sign Up. You can now view and download portions of your medical record. 10. Click the Download Summary menu link to download a portable copy of your medical information. If you have questions, please visit the Frequently Asked Questions section of the Klinq website. Remember, K[employer]q is NOT to be used for urgent needs. For medical emergencies, dial 911. Now available from your iPhone and Android! Please provide this summary of care documentation to your next provider. Your primary care clinician is listed as Merline Stack. If you have any questions after today's visit, please call [phone].

## 2017-08-16 LAB
25(OH)D3+25(OH)D2 SERPL-MCNC: 77.8 NG/ML (ref 30–100)
ALBUMIN SERPL-MCNC: 4.3 G/DL (ref 3.5–5.5)
ALBUMIN/GLOB SERPL: 1.7 {RATIO} (ref 1.2–2.2)
ALP SERPL-CCNC: 76 IU/L (ref 39–117)
ALT SERPL-CCNC: 10 IU/L (ref 0–32)
APPEARANCE UR: ABNORMAL
AST SERPL-CCNC: 18 IU/L (ref 0–40)
BACTERIA #/AREA URNS HPF: ABNORMAL /[HPF]
BASOPHILS # BLD AUTO: 0 X10E3/UL (ref 0–0.2)
BASOPHILS NFR BLD AUTO: 1 %
BILIRUB SERPL-MCNC: 0.4 MG/DL (ref 0–1.2)
BILIRUB UR QL STRIP: NEGATIVE
BUN SERPL-MCNC: 15 MG/DL (ref 6–24)
BUN/CREAT SERPL: 19 (ref 9–23)
CALCIUM SERPL-MCNC: 9.8 MG/DL (ref 8.7–10.2)
CASTS URNS QL MICRO: ABNORMAL /LPF
CHLORIDE SERPL-SCNC: 102 MMOL/L (ref 96–106)
CHOLEST SERPL-MCNC: 173 MG/DL (ref 100–199)
CO2 SERPL-SCNC: 23 MMOL/L (ref 18–29)
COLOR UR: YELLOW
CREAT SERPL-MCNC: 0.79 MG/DL (ref 0.57–1)
EOSINOPHIL # BLD AUTO: 0.4 X10E3/UL (ref 0–0.4)
EOSINOPHIL NFR BLD AUTO: 10 %
EPI CELLS #/AREA URNS HPF: ABNORMAL /HPF
ERYTHROCYTE [DISTWIDTH] IN BLOOD BY AUTOMATED COUNT: 16 % (ref 12.3–15.4)
EST. AVERAGE GLUCOSE BLD GHB EST-MCNC: 117 MG/DL
GLOBULIN SER CALC-MCNC: 2.5 G/DL (ref 1.5–4.5)
GLUCOSE SERPL-MCNC: 91 MG/DL (ref 65–99)
GLUCOSE UR QL: NEGATIVE
HBA1C MFR BLD: 5.7 % (ref 4.8–5.6)
HCT VFR BLD AUTO: 39.4 % (ref 34–46.6)
HDLC SERPL-MCNC: 56 MG/DL
HGB BLD-MCNC: 12.8 G/DL (ref 11.1–15.9)
HGB UR QL STRIP: ABNORMAL
IMM GRANULOCYTES # BLD: 0 X10E3/UL (ref 0–0.1)
IMM GRANULOCYTES NFR BLD: 0 %
KETONES UR QL STRIP: NEGATIVE
LDLC SERPL CALC-MCNC: 105 MG/DL (ref 0–99)
LEUKOCYTE ESTERASE UR QL STRIP: ABNORMAL
LYMPHOCYTES # BLD AUTO: 1.5 X10E3/UL (ref 0.7–3.1)
LYMPHOCYTES NFR BLD AUTO: 37 %
MCH RBC QN AUTO: 26 PG (ref 26.6–33)
MCHC RBC AUTO-ENTMCNC: 32.5 G/DL (ref 31.5–35.7)
MCV RBC AUTO: 80 FL (ref 79–97)
MICRO URNS: ABNORMAL
MONOCYTES # BLD AUTO: 0.4 X10E3/UL (ref 0.1–0.9)
MONOCYTES NFR BLD AUTO: 9 %
MUCOUS THREADS URNS QL MICRO: PRESENT
NEUTROPHILS # BLD AUTO: 1.8 X10E3/UL (ref 1.4–7)
NEUTROPHILS NFR BLD AUTO: 43 %
NITRITE UR QL STRIP: NEGATIVE
PH UR STRIP: 6.5 [PH] (ref 5–7.5)
PLATELET # BLD AUTO: 244 X10E3/UL (ref 150–379)
POTASSIUM SERPL-SCNC: 4.1 MMOL/L (ref 3.5–5.2)
PROT SERPL-MCNC: 6.8 G/DL (ref 6–8.5)
PROT UR QL STRIP: NEGATIVE
RBC # BLD AUTO: 4.92 X10E6/UL (ref 3.77–5.28)
RBC #/AREA URNS HPF: ABNORMAL /HPF
SODIUM SERPL-SCNC: 141 MMOL/L (ref 134–144)
SP GR UR: 1.01 (ref 1–1.03)
TRIGL SERPL-MCNC: 59 MG/DL (ref 0–149)
TSH SERPL DL<=0.005 MIU/L-ACNC: 1.66 UIU/ML (ref 0.45–4.5)
UROBILINOGEN UR STRIP-MCNC: 0.2 MG/DL (ref 0.2–1)
VLDLC SERPL CALC-MCNC: 12 MG/DL (ref 5–40)
WBC # BLD AUTO: 4.1 X10E3/UL (ref 3.4–10.8)
WBC #/AREA URNS HPF: >30 /HPF

## 2017-12-08 ENCOUNTER — HOSPITAL ENCOUNTER (OUTPATIENT)
Dept: LAB | Age: 57
Discharge: HOME | End: 2017-12-08
Payer: COMMERCIAL

## 2017-12-08 ENCOUNTER — OFFICE VISIT (OUTPATIENT)
Dept: FAMILY MEDICINE CLINIC | Age: 57
End: 2017-12-08

## 2017-12-08 VITALS
OXYGEN SATURATION: 99 % | DIASTOLIC BLOOD PRESSURE: 70 MMHG | HEART RATE: 73 BPM | TEMPERATURE: 97.5 F | WEIGHT: 215.4 LBS | BODY MASS INDEX: 34.62 KG/M2 | SYSTOLIC BLOOD PRESSURE: 124 MMHG | HEIGHT: 66 IN | RESPIRATION RATE: 16 BRPM

## 2017-12-08 DIAGNOSIS — Z01.419 WOMEN'S ANNUAL ROUTINE GYNECOLOGICAL EXAMINATION: ICD-10-CM

## 2017-12-08 DIAGNOSIS — J45.20 MILD INTERMITTENT ASTHMA WITHOUT COMPLICATION: Primary | ICD-10-CM

## 2017-12-08 DIAGNOSIS — B35.1 FUNGAL TOENAIL INFECTION: ICD-10-CM

## 2017-12-08 PROCEDURE — 88175 CYTOPATH C/V AUTO FLUID REDO: CPT | Performed by: INTERNAL MEDICINE

## 2017-12-08 RX ORDER — PREDNISONE 50 MG/1
TABLET ORAL
COMMUNITY
Start: 2017-11-26 | End: 2017-12-08 | Stop reason: ALTCHOICE

## 2017-12-08 RX ORDER — BENZONATATE 100 MG/1
CAPSULE ORAL
COMMUNITY
Start: 2017-11-26 | End: 2017-12-08 | Stop reason: ALTCHOICE

## 2017-12-08 RX ORDER — AZITHROMYCIN 250 MG/1
TABLET, FILM COATED ORAL
COMMUNITY
Start: 2017-11-26 | End: 2017-12-08 | Stop reason: ALTCHOICE

## 2017-12-08 RX ORDER — ALBUTEROL SULFATE 90 UG/1
2 AEROSOL, METERED RESPIRATORY (INHALATION)
Qty: 1 INHALER | Refills: 12 | Status: SHIPPED | OUTPATIENT
Start: 2017-12-08

## 2017-12-08 RX ORDER — METRONIDAZOLE 500 MG/1
500 TABLET ORAL 3 TIMES DAILY
Qty: 30 TAB | Refills: 0 | Status: SHIPPED | OUTPATIENT
Start: 2017-12-08 | End: 2017-12-18

## 2017-12-08 RX ORDER — PROMETHAZINE HYDROCHLORIDE AND DEXTROMETHORPHAN HYDROBROMIDE 6.25; 15 MG/5ML; MG/5ML
SYRUP ORAL
COMMUNITY
Start: 2017-11-26 | End: 2017-12-08 | Stop reason: ALTCHOICE

## 2017-12-08 RX ORDER — TERBINAFINE HYDROCHLORIDE 250 MG/1
250 TABLET ORAL DAILY
Qty: 28 TAB | Refills: 0 | Status: SHIPPED | OUTPATIENT
Start: 2017-12-08

## 2017-12-08 NOTE — PROGRESS NOTES
Chief Complaint   Patient presents with    Gyn Exam    Nail Problem     bilateral great toe discoloration     HPI:  Abhay Mcmanus is a 65 y.o. AA female presents for annual Gyn Exam.  Also, she has additional complaints of bilateral great toe discoloration noted 2 months ago. Denies injury, pain, swelling. O/P nails have blue/black streaks suspicious for melanoma but could be due to fungal infection. Plan: refer to podiatrist for evaluation. Meanwhile, will start on Lamisil while awaiting the schedule. Review of Systems  As per hpi    Past Medical History:   Diagnosis Date    Anemia     Arthritis      Past Surgical History:   Procedure Laterality Date    HX HIP REPLACEMENT Right 04/13/2017    HX ORTHOPAEDIC      HX REFRACTIVE SURGERY  2006     Social History     Social History    Marital status: SINGLE     Spouse name: N/A    Number of children: N/A    Years of education: N/A     Social History Main Topics    Smoking status: Never Smoker    Smokeless tobacco: Never Used    Alcohol use 0.6 oz/week     1 Glasses of wine, 0 Standard drinks or equivalent per week    Drug use: No    Sexual activity: No     Other Topics Concern    None     Social History Narrative     Current Outpatient Prescriptions   Medication Sig Dispense Refill    turmeric root extract 500 mg cap Take  by mouth.  albuterol (PROVENTIL HFA, VENTOLIN HFA, PROAIR HFA) 90 mcg/actuation inhaler Take 2 Puffs by inhalation every four (4) hours as needed for Wheezing. Indications: Acute Asthma Attack 1 Inhaler 12    GINGER ROOT (GINGER, ZINGIBER OFFICINALIS,) 550 mg cap Take  by mouth.  LACTOBACILLUS ACIDOPHILUS (PROBIOTIC PO) Take  by mouth.  cholecalciferol, VITAMIN D3, (VITAMIN D3) 5,000 unit tab tablet Take  by mouth daily.  FLAXSEED OIL PO Take  by mouth.  CALCIUM PO Take 500 mg by mouth daily.  Cetirizine (ZYRTEC) 10 mg cap Take  by mouth.        Allergies   Allergen Reactions    Codeine Nausea Only    Shellfish Derived Unknown (comments)     Throat swells a little     Objective:  Visit Vitals    /70 (BP 1 Location: Right arm, BP Patient Position: Sitting)    Pulse 73    Temp 97.5 °F (36.4 °C) (Oral)    Resp 16    Ht 5' 5.5\" (1.664 m)    Wt 215 lb 6.4 oz (97.7 kg)    SpO2 99%    BMI 35.3 kg/m2     Physical Exam:   General appearance - alert, oriented X 3, well appearing in no distress  EYE-PERRL, EOMI  Neck - supple, no significant adenopathy   Chest - clear to auscultation, no wheezes, rales or rhonchi   Heart - normal rate, regular rhythm, normal blood pressure   Abdomen - soft, nontender, nondistended, no masses or organomegaly  Lymph- no adenopathy palpable  Ext-peripheral pulses normal, no pedal edema  Skin-Warm and dry. no hyperpigmentation  Neuro -alert, oriented, normal speech, no focal findings   Pelvic exam:   VAGINA: normal appearing vagina with normal color and discharge, no lesions  UTERUS: uterus is normal size, shape, consistency and nontender  ADNEXA: normal adnexa in size, nontender and no masses  PAP: Pap smear done today.     Results for orders placed or performed in visit on 08/15/17   LIPID PANEL   Result Value Ref Range    Cholesterol, total 173 100 - 199 mg/dL    Triglyceride 59 0 - 149 mg/dL    HDL Cholesterol 56 >39 mg/dL    VLDL, calculated 12 5 - 40 mg/dL    LDL, calculated 105 (H) 0 - 99 mg/dL   METABOLIC PANEL, COMPREHENSIVE   Result Value Ref Range    Glucose 91 65 - 99 mg/dL    BUN 15 6 - 24 mg/dL    Creatinine 0.79 0.57 - 1.00 mg/dL    GFR est non-AA 85 >59 mL/min/1.73    GFR est AA 97 >59 mL/min/1.73    BUN/Creatinine ratio 19 9 - 23    Sodium 141 134 - 144 mmol/L    Potassium 4.1 3.5 - 5.2 mmol/L    Chloride 102 96 - 106 mmol/L    CO2 23 18 - 29 mmol/L    Calcium 9.8 8.7 - 10.2 mg/dL    Protein, total 6.8 6.0 - 8.5 g/dL    Albumin 4.3 3.5 - 5.5 g/dL    GLOBULIN, TOTAL 2.5 1.5 - 4.5 g/dL    A-G Ratio 1.7 1.2 - 2.2    Bilirubin, total 0.4 0.0 - 1.2 mg/dL Alk. phosphatase 76 39 - 117 IU/L    AST (SGOT) 18 0 - 40 IU/L    ALT (SGPT) 10 0 - 32 IU/L   CBC WITH AUTOMATED DIFF   Result Value Ref Range    WBC 4.1 3.4 - 10.8 x10E3/uL    RBC 4.92 3.77 - 5.28 x10E6/uL    HGB 12.8 11.1 - 15.9 g/dL    HCT 39.4 34.0 - 46.6 %    MCV 80 79 - 97 fL    MCH 26.0 (L) 26.6 - 33.0 pg    MCHC 32.5 31.5 - 35.7 g/dL    RDW 16.0 (H) 12.3 - 15.4 %    PLATELET 865 602 - 021 x10E3/uL    NEUTROPHILS 43 %    Lymphocytes 37 %    MONOCYTES 9 %    EOSINOPHILS 10 %    BASOPHILS 1 %    ABS. NEUTROPHILS 1.8 1.4 - 7.0 x10E3/uL    Abs Lymphocytes 1.5 0.7 - 3.1 x10E3/uL    ABS. MONOCYTES 0.4 0.1 - 0.9 x10E3/uL    ABS. EOSINOPHILS 0.4 0.0 - 0.4 x10E3/uL    ABS. BASOPHILS 0.0 0.0 - 0.2 x10E3/uL    IMMATURE GRANULOCYTES 0 %    ABS. IMM. GRANS. 0.0 0.0 - 0.1 x10E3/uL   TSH 3RD GENERATION   Result Value Ref Range    TSH 1.660 0.450 - 4.500 uIU/mL   URINALYSIS W/ RFLX MICROSCOPIC   Result Value Ref Range    Specific Gravity 1.014 1.005 - 1.030    pH (UA) 6.5 5.0 - 7.5    Color Yellow Yellow    Appearance Cloudy (A) Clear    Leukocyte Esterase 3+ (A) Negative    Protein Negative Negative/Trace    Glucose Negative Negative    Ketone Negative Negative    Blood 1+ (A) Negative    Bilirubin Negative Negative    Urobilinogen 0.2 0.2 - 1.0 mg/dL    Nitrites Negative Negative    Microscopic Examination See additional order    HEMOGLOBIN A1C WITH EAG   Result Value Ref Range    Hemoglobin A1c 5.7 (H) 4.8 - 5.6 %    Estimated average glucose 117 mg/dL   VITAMIN D, 25 HYDROXY   Result Value Ref Range    VITAMIN D, 25-HYDROXY 77.8 30.0 - 100.0 ng/mL   MICROSCOPIC EXAMINATION   Result Value Ref Range    WBC >30 (A) 0 - 5 /hpf    RBC 11-30 (A) 0 - 2 /hpf    Epithelial cells 0-10 0 - 10 /hpf    Casts None seen None seen /lpf    Mucus Present Not Estab.     Bacteria None seen None seen/Few     Assessment/Plan:    ICD-10-CM ICD-9-CM    1. Mild intermittent asthma without complication L33.67 319.55 albuterol (PROVENTIL HFA, VENTOLIN HFA, PROAIR HFA) 90 mcg/actuation inhaler   2. Women's annual routine gynecological examination Z01.419 V72.31 PAP (IMAGE GUIDED) W/REFL HPV ALL PATHOLOGY ([id number])      metroNIDAZOLE (FLAGYL) 500 mg tablet      REFERRAL TO GYNECOLOGY      PAP (IMAGE GUIDED) W/REFL HPV ALL PATHOLOGY ([id number])      CANCELED: REFERRAL TO GYN ONCOLOGY   3. Fungal toenail infection B35.1 110.1 terbinafine HCl (LAMISIL) 250 mg tablet      REFERRAL TO PODIATRY     Patient Instructions          Toenail Fungus: Care Instructions  Your Care Instructions  A toenail that is infected by a fungus usually turns white or yellow. As the fungus spreads, the nail turns a darker color and gets thicker, and its edges start to turn ragged and crumble. A bad infection can cause toe pain, and the nail may pull away from the toe. Toenails that are exposed to moisture and warmth a lot are more likely to get infected by a fungus. This can happen from wearing sweaty shoes often and from walking barefoot on shower floors. It is hard to treat toenail fungus, and the infection can return after it has cleared up. But medicines can sometimes get rid of toenail fungus for good. If the infection is very bad, or if it causes a lot of pain, you may need to have the nail removed. Follow-up care is a key part of your treatment and safety. Be sure to make and go to all appointments, and call your doctor if you are having problems. It's also a good idea to know your test results and keep a list of the medicines you take. How can you care for yourself at home? · Take your medicines exactly as prescribed. Call your doctor if you have any problems with your medicine. You will get more details on the specific medicines your doctor prescribes. · If your doctor gave you a cream or liquid to put on your toenail, use it exactly as directed. · Wash your feet often, and wash your hands after touching your feet. · Keep your toenails clean and dry.  Dry your feet completely after you bathe and before you put on shoes and socks. · Keep your toenails trimmed. · Change socks often. Wear dry socks that absorb moisture. · Do not go barefoot in public places. · Use a spray or powder that fights fungus on your feet and in your shoes. · Do not pick at the skin around your nails. · Do not use nail polish or fake nails on your toenails. When should you call for help? Call your doctor now or seek immediate medical care if:  ? · You have signs of infection, such as:  ¨ Increased pain, swelling, warmth, or redness. ¨ Red streaks leading from the site. ¨ Pus draining from the site. ¨ A fever. ? · You have new or increased toe pain. ? Watch closely for changes in your health, and be sure to contact your doctor if:  ? · You do not get better as expected. Where can you learn more? Go to http://wen-clau.info/. Enter D202 in the search box to learn more about \"Toenail Fungus: Care Instructions. \"  Current as of: October 13, 2016  Content Version: 11.4  © 4575-0619 [employer]. Care instructions adapted under license by [employer] (which disclaims liability or warranty for this information). If you have questions about a medical condition or this instruction, always ask your healthcare professional. [address] any warranty or liability for your use of this information. Follow-up Disposition:  Return in about 3 months (around 3/8/2018), or if symptoms worsen or fail to improve, for routine follow up.

## 2017-12-08 NOTE — PROGRESS NOTES
Chief Complaint   Patient presents with   [facility] Exam    Nail Problem     bilateral great toe discoloration     Patient here for pap smear and bilateral great toe discoloration. Patient would also like referral for mammogram. Declined immunization tdap.

## 2017-12-08 NOTE — MR AVS SNAPSHOT
Visit Information Date & Time Provider Department Dept. Phone Encounter #  
 12/8/2017  8:00 AM Regan Rousseau MD [facility] at [address] [id number] Follow-up Instructions Return in about 3 months (around 3/8/2018), or if symptoms worsen or fail to improve, for routine follow up. Upcoming Health Maintenance Date Due DTaP/Tdap/Td series (1 - Tdap) 10/1/1982 FOBT Q 1 YEAR AGE 50-75 10/1/2011 BREAST CANCER SCRN MAMMOGRAM 12/13/2018 PAP AKA CERVICAL CYTOLOGY 11/18/2019 Allergies as of 12/8/2017  Review Complete On: 12/8/2017 By: Izabella Jackson LPN Severity Noted Reaction Type Reactions Codeine  04/04/2016    Nausea Only Shellfish Derived  04/04/2016    Unknown (comments) Throat swells a little Current Immunizations  Reviewed on 10/9/2017 Name Date Influenza Vaccine 10/6/2017 Influenza Vaccine Kati Mccarthy) 10/17/2016 Not reviewed this visit You Were Diagnosed With   
  
 Codes Comments Mild intermittent asthma without complication    -  Primary ICD-10-CM: J45.20 ICD-9-CM: 493.90 Women's annual routine gynecological examination     ICD-10-CM: D33.780 ICD-9-CM: V72.31 Fungal toenail infection     ICD-10-CM: B35.1 ICD-9-CM: 110.1 Vitals BP Pulse Temp Resp Height(growth percentile) Weight(growth percentile) 124/70 (BP 1 Location: Right arm, BP Patient Position: Sitting) 73 97.5 °F (36.4 °C) (Oral) 16 5' 5.5\" (1.664 m) 215 lb 6.4 oz (97.7 kg) SpO2 BMI OB Status Smoking Status 99% 35.3 kg/m2 Menopause Never Smoker BMI and BSA Data Body Mass Index Body Surface Area  
 35.3 kg/m 2 2.12 m 2 Preferred Pharmacy Pharmacy Name Phone Kristin Blizzard [address] [place] [address] Dr Navarro, [address] [place] [address]. [phone] Your Updated Medication List  
  
   
This list is accurate as of: 12/8/17  9:21 AM.  Always use your most recent med list.  
  
  
  
  
 albuterol 90 mcg/actuation inhaler Commonly known as:  PROVENTIL HFA, VENTOLIN HFA, PROAIR HFA Take 2 Puffs by inhalation every four (4) hours as needed for Wheezing. Indications: Acute Asthma Attack CALCIUM PO Take 500 mg by mouth daily. cholecalciferol (VITAMIN D3) 5,000 unit Tab tablet Commonly known as:  VITAMIN D3 Take  by mouth daily. FLAXSEED OIL PO Take  by mouth.  
  
 ginger (Zingiber officinalis) 550 mg Cap Take  by mouth.  
  
 metroNIDAZOLE 500 mg tablet Commonly known as:  FLAGYL Take 1 Tab by mouth three (3) times daily for 10 days. PROBIOTIC PO Take  by mouth. terbinafine HCl 250 mg tablet Commonly known as:  LAMISIL Take 1 Tab by mouth daily. turmeric root extract 500 mg Cap Take  by mouth. ZyrTEC 10 mg Cap Generic drug:  Cetirizine Take  by mouth. Prescriptions Sent to Pharmacy Refills  
 albuterol (PROVENTIL HFA, VENTOLIN HFA, PROAIR HFA) 90 mcg/actuation inhaler 12 Sig: Take 2 Puffs by inhalation every four (4) hours as needed for Wheezing. Indications: Acute Asthma Attack Class: Normal  
 Pharmacy: [address] Ph #: [phone] Route: Inhalation  
 terbinafine HCl (LAMISIL) 250 mg tablet 0 Sig: Take 1 Tab by mouth daily. Class: Normal  
 Pharmacy: [address] Ph #: [phone] Route: Oral  
 metroNIDAZOLE (FLAGYL) 500 mg tablet 0 Sig: Take 1 Tab by mouth three (3) times daily for 10 days. Class: Normal  
 Pharmacy: [address] Ph #: [phone] Route: Oral  
  
We Performed the Following REFERRAL TO GYNECOLOGY [REF30 Custom] Comments:  
 Patient had significant bleeding from the cervice during pelvis exam, I am concerned for uterine cancer, please evaluate REFERRAL TO PODIATRY [REF90 Custom] Comments: Please evaluate for what appears to be toe fungal, she has been started on antifungal,  also I am concern about other condition, could please get a biopsy Follow-up Instructions Return in about 3 months (around 3/8/2018), or if symptoms worsen or fail to improve, for routine follow up. To-Do List   
 12/08/2017 Pathology:  PAP (IMAGE GUIDED) W/REFL HPV ALL PATHOLOGY ([id number])   
  
 12/11/2017 9:30 AM  
  Appointment with HAKAN [facility] 1 at [facility] ([phone]) Shower or bathe using soap and water. Do not use deodorant, powder, perfumes, or lotion the day of your exam.  If your prior mammograms were not performed at [facility] 6 please bring films with you or forward prior images 2 days before your procedure. Check in at registration 15min before your appointment time unless you were instructed to do otherwise. A script is not necessary, but if you have one, please bring it on the day of the mammogram or have it faxed to the department. [facility] [phone] [facility] [place]  [phone] [facility] [address]  [phone] [facility] [phone] [facility] [address] Geraldina Epley [phone] Referral Information Referral ID Referred By Referred To  
  
 [id number] [facility], [address] [facility] [address] [facility] [address] [place], [address] Phone: [phone] Fax: [phone] Visits Status Start Date End Date 1 New Request 12/8/17 12/8/18 If your referral has a status of pending review or denied, additional information will be sent to support the outcome of this decision. Referral ID Referred By Referred To  
 [id number] [facility] [address] [place], [address] Phone: [phone] Fax: [phone] Visits Status Start Date End Date 1 New Request 12/8/17 12/8/18 If your referral has a status of pending review or denied, additional information will be sent to support the outcome of this decision. Patient Instructions Toenail Fungus: Care Instructions Your Care Instructions A toenail that is infected by a fungus usually turns white or yellow. As the fungus spreads, the nail turns a darker color and gets thicker, and its edges start to turn ragged and crumble. A bad infection can cause toe pain, and the nail may pull away from the toe. Toenails that are exposed to moisture and warmth a lot are more likely to get infected by a fungus. This can happen from wearing sweaty shoes often and from walking barefoot on shower floors. It is hard to treat toenail fungus, and the infection can return after it has cleared up. But medicines can sometimes get rid of toenail fungus for good. If the infection is very bad, or if it causes a lot of pain, you may need to have the nail removed. Follow-up care is a key part of your treatment and safety. Be sure to make and go to all appointments, and call your doctor if you are having problems. It's also a good idea to know your test results and keep a list of the medicines you take. How can you care for yourself at home? · Take your medicines exactly as prescribed. Call your doctor if you have any problems with your medicine. You will get more details on the specific medicines your doctor prescribes. · If your doctor gave you a cream or liquid to put on your toenail, use it exactly as directed. · Wash your feet often, and wash your hands after touching your feet. · Keep your toenails clean and dry. Dry your feet completely after you bathe and before you put on shoes and socks. · Keep your toenails trimmed. · Change socks often. Wear dry socks that absorb moisture. · Do not go barefoot in public places. · Use a spray or powder that fights fungus on your feet and in your shoes. · Do not pick at the skin around your nails. · Do not use nail polish or fake nails on your toenails. When should you call for help? Call your doctor now or seek immediate medical care if: 
? · You have signs of infection, such as: 
¨ Increased pain, swelling, warmth, or redness. ¨ Red streaks leading from the site. ¨ Pus draining from the site. ¨ A fever. ? · You have new or increased toe pain. ? Watch closely for changes in your health, and be sure to contact your doctor if: 
? · You do not get better as expected. Where can you learn more? Go to http://wen-clau.info/. Enter D202 in the search box to learn more about \"Toenail Fungus: Care Instructions. \" Current as of: October 13, 2016 Content Version: 11.4 © 1083-4267 [employer]. Care instructions adapted under license by [employer] (which disclaims liability or warranty for this information). If you have questions about a medical condition or this instruction, always ask your healthcare professional. [place] [address] any warranty or liability for your use of this information. Introducing [facility]! [employer] introduces [employer] patient portal. Now you can access parts of your medical record, email your doctor's office, and request medication refills online. 1. In your internet browser, go to [email or web address] [email or web address] 2. Click on the First Time User? Click Here link in the Sign In box. You will see the New Member Sign Up page. 3. Enter your [employer] Access Code exactly as it appears below. You will not need to use this code after youve completed the sign-up process. If you do not sign up before the expiration date, you must request a new code. · [employer] Access Code: [email or web address] Expires: 3/8/2018  8:16 AM 
 
4.  Enter the last four digits of your Social Security Number (xxxx) and Date of Birth (mm/dd/yyyy) as indicated and click Submit. You will be taken to the next sign-up page. 5. Create a [employer] ID. This will be your [employer] login ID and cannot be changed, so think of one that is secure and easy to remember. 6. Create a [employer] password. You can change your password at any time. 7. Enter your Password Reset Question and Answer. This can be used at a later time if you forget your password. 8. Enter your e-mail address. You will receive e-mail notification when new information is available in [address] 9. Click Sign Up. You can now view and download portions of your medical record. 10. Click the Download Summary menu link to download a portable copy of your medical information. If you have questions, please visit the Frequently Asked Questions section of the [employer] website. Remember, [employer] is NOT to be used for urgent needs. For medical emergencies, dial 911. Now available from your iPhone and Android! Please provide this summary of care documentation to your next provider. Your primary care clinician is listed as Patricia Murillo. If you have any questions after today's visit, please call [phone].

## 2017-12-08 NOTE — PATIENT INSTRUCTIONS
Toenail Fungus: Care Instructions  Your Care Instructions  A toenail that is infected by a fungus usually turns white or yellow. As the fungus spreads, the nail turns a darker color and gets thicker, and its edges start to turn ragged and crumble. A bad infection can cause toe pain, and the nail may pull away from the toe. Toenails that are exposed to moisture and warmth a lot are more likely to get infected by a fungus. This can happen from wearing sweaty shoes often and from walking barefoot on shower floors. It is hard to treat toenail fungus, and the infection can return after it has cleared up. But medicines can sometimes get rid of toenail fungus for good. If the infection is very bad, or if it causes a lot of pain, you may need to have the nail removed. Follow-up care is a key part of your treatment and safety. Be sure to make and go to all appointments, and call your doctor if you are having problems. It's also a good idea to know your test results and keep a list of the medicines you take. How can you care for yourself at home? · Take your medicines exactly as prescribed. Call your doctor if you have any problems with your medicine. You will get more details on the specific medicines your doctor prescribes. · If your doctor gave you a cream or liquid to put on your toenail, use it exactly as directed. · Wash your feet often, and wash your hands after touching your feet. · Keep your toenails clean and dry. Dry your feet completely after you bathe and before you put on shoes and socks. · Keep your toenails trimmed. · Change socks often. Wear dry socks that absorb moisture. · Do not go barefoot in public places. · Use a spray or powder that fights fungus on your feet and in your shoes. · Do not pick at the skin around your nails. · Do not use nail polish or fake nails on your toenails. When should you call for help?   Call your doctor now or seek immediate medical care if:  ? · You have signs of infection, such as:  ¨ Increased pain, swelling, warmth, or redness. ¨ Red streaks leading from the site. ¨ Pus draining from the site. ¨ A fever. ? · You have new or increased toe pain. ? Watch closely for changes in your health, and be sure to contact your doctor if:  ? · You do not get better as expected. Where can you learn more? Go to http://wen-clau.info/. Enter D202 in the search box to learn more about \"Toenail Fungus: Care Instructions. \"  Current as of: October 13, 2016  Content Version: 11.4  © 2215-7341 [employer]. Care instructions adapted under license by [employer] (which disclaims liability or warranty for this information). If you have questions about a medical condition or this instruction, always ask your healthcare professional. [address] any warranty or liability for your use of this information.

## 2017-12-11 ENCOUNTER — HOSPITAL ENCOUNTER (OUTPATIENT)
Dept: MAMMOGRAPHY | Age: 57
Discharge: HOME | End: 2017-12-11
Payer: COMMERCIAL

## 2017-12-11 DIAGNOSIS — Z12.31 VISIT FOR SCREENING MAMMOGRAM: ICD-10-CM

## 2017-12-11 PROCEDURE — 77067 SCR MAMMO BI INCL CAD: CPT

## 2018-12-12 ENCOUNTER — HOSPITAL ENCOUNTER (OUTPATIENT)
Dept: MAMMOGRAPHY | Age: 58
Discharge: HOME | End: 2018-12-12
Attending: OBSTETRICS & GYNECOLOGY
Payer: COMMERCIAL

## 2018-12-12 DIAGNOSIS — Z78.0 POSTMENOPAUSAL: ICD-10-CM

## 2018-12-12 DIAGNOSIS — Z13.820 SCREENING FOR OSTEOPOROSIS: ICD-10-CM

## 2018-12-12 DIAGNOSIS — Z12.31 VISIT FOR SCREENING MAMMOGRAM: ICD-10-CM

## 2018-12-12 PROCEDURE — 77067 SCR MAMMO BI INCL CAD: CPT

## 2018-12-12 PROCEDURE — 77080 DXA BONE DENSITY AXIAL: CPT

## 2019-12-09 ENCOUNTER — HOSPITAL ENCOUNTER (OUTPATIENT)
Dept: MAMMOGRAPHY | Age: 59
Discharge: HOME | End: 2019-12-09
Attending: OBSTETRICS & GYNECOLOGY
Payer: COMMERCIAL

## 2019-12-09 DIAGNOSIS — Z12.31 VISIT FOR SCREENING MAMMOGRAM: ICD-10-CM

## 2019-12-09 PROCEDURE — 77067 SCR MAMMO BI INCL CAD: CPT

## 2020-12-04 ENCOUNTER — TRANSCRIBE ORDER (OUTPATIENT)
Dept: SCHEDULING | Age: 60
End: 2020-12-04

## 2020-12-04 DIAGNOSIS — Z12.31 VISIT FOR SCREENING MAMMOGRAM: Primary | ICD-10-CM

## 2020-12-18 ENCOUNTER — HOSPITAL ENCOUNTER (OUTPATIENT)
Dept: MAMMOGRAPHY | Age: 60
Discharge: HOME | End: 2020-12-18
Attending: OBSTETRICS & GYNECOLOGY
Payer: COMMERCIAL

## 2020-12-18 DIAGNOSIS — Z12.31 VISIT FOR SCREENING MAMMOGRAM: ICD-10-CM

## 2020-12-18 PROCEDURE — 77063 BREAST TOMOSYNTHESIS BI: CPT

## 2021-11-01 ENCOUNTER — TRANSCRIBE ORDER (OUTPATIENT)
Dept: SCHEDULING | Age: 61
End: 2021-11-01

## 2021-11-01 DIAGNOSIS — Z12.31 SCREENING MAMMOGRAM FOR HIGH-RISK PATIENT: Primary | ICD-10-CM

## 2021-12-20 ENCOUNTER — HOSPITAL ENCOUNTER (OUTPATIENT)
Dept: MAMMOGRAPHY | Age: 61
Discharge: HOME | End: 2021-12-20
Attending: OBSTETRICS & GYNECOLOGY
Payer: OTHER GOVERNMENT

## 2021-12-20 DIAGNOSIS — Z12.31 SCREENING MAMMOGRAM FOR HIGH-RISK PATIENT: ICD-10-CM

## 2021-12-20 PROCEDURE — 77067 SCR MAMMO BI INCL CAD: CPT

## 2021-12-23 NOTE — MR AVS SNAPSHOT
Internal Medicine Internal Medicine Internal Medicine Visit Information Date & Time Provider Department Dept. Phone Encounter #  
 4/5/2017  2:45 PM Lillian Ulloa MD [address] at [address] [id number] Follow-up Instructions Return in about 3 months (around 7/5/2017), or if symptoms worsen or fail to improve, for routine follow up. Your Appointments 5/15/2017  8:00 AM  
ROUTINE CARE with Lillian Ulloa MD  
[address] [address] [place] [address] [facility]) Appt Note:   
 [phone]  
[facility] Upcoming Health Maintenance Date Due FOBT Q 1 YEAR AGE 50-75 10/1/2011 DTaP/Tdap/Td series (1 - Tdap) 6/27/2017* BREAST CANCER SCRN MAMMOGRAM 12/13/2018 PAP AKA CERVICAL CYTOLOGY 11/18/2019 *Topic was postponed. The date shown is not the original due date. Allergies as of 4/5/2017  Review Complete On: 4/5/2017 By: Lillian Ulloa MD  
  
 Severity Noted Reaction Type Reactions Codeine  04/04/2016    Nausea Only Shellfish Derived  04/04/2016    Unknown (comments) Throat swells a little Current Immunizations  Reviewed on 2/6/2017 Name Date Influenza Vaccine Vickiekingsley Simmons) 10/17/2016 Not reviewed this visit You Were Diagnosed With   
  
 Codes Comments Dyslipidemia    -  Primary ICD-10-CM: E78.5 ICD-9-CM: 272.4 Pre-op exam     ICD-10-CM: O50.772 ICD-9-CM: V72.84   
 IGT (impaired glucose tolerance)     ICD-10-CM: R73.02 
ICD-9-CM: 790.22 Vitals BP Pulse Temp Resp Height(growth percentile) Weight(growth percentile) 121/67 (BP 1 Location: Right arm, BP Patient Position: Sitting) 77 96.9 °F (36.1 °C) 18 5' 5.5\" (1.664 m) 227 lb 12.8 oz (103.3 kg) SpO2 BMI OB Status Smoking Status 97% 37.33 kg/m2 Menopause Never Smoker Vitals History BMI and BSA Data  Body Mass Index Body Surface Area  
 37.33 kg/m 2 2.18 m 2  
  
  
 Internal Medicine Internal Medicine MICU MICU Preferred Pharmacy Pharmacy Name Phone [facility] [address], [address] [place] [address]. [phone] Your Updated Medication List  
  
   
This list is accurate as of: 4/5/17  3:47 PM.  Always use your most recent med list.  
  
  
  
  
 albuterol 90 mcg/actuation inhaler Commonly known as:  PROVENTIL HFA, VENTOLIN HFA, PROAIR HFA Take 2 Puffs by inhalation every four (4) hours as needed for Wheezing. Indications: Acute Asthma Attack CALCIUM PO Take 500 mg by mouth daily. cholecalciferol (VITAMIN D3) 5,000 unit Tab tablet Commonly known as:  VITAMIN D3 Take  by mouth daily. FLAXSEED OIL PO Take  by mouth.  
  
 ginger (Zingiber officinalis) 550 mg Cap Take  by mouth. glucosamine-chondroitin 500-400 mg Cap Commonly known as:  [address] Take 1 Cap by mouth daily. PROBIOTIC PO Take  by mouth.  
  
 sulindac 200 mg tablet Commonly known as:  CLINORIL Instead of diclofenac or other NSAIDS; take 200 mg BID as needed for pain ZyrTEC 10 mg Cap Generic drug:  Cetirizine Take  by mouth. We Performed the Following AMB POC EKG ROUTINE W/ 12 LEADS, INTER & REP [65290 CPT(R)] Follow-up Instructions Return in about 3 months (around 7/5/2017), or if symptoms worsen or fail to improve, for routine follow up. Patient Instructions Based on physical exam and EKG reading , patient is stable for scheduled precedure Introducing [facility]! [employer] introduces mygola patient portal. Now you can access parts of your medical record, email your doctor's office, and request medication refills online. 1. In your internet browser, go to [email or web address] 2. Click on the First Time User? Click Here link in the Sign In box. You will see the New Member Sign Up page. 3. Enter your mygola Access Code exactly as it appears below.  You will not need to use this code after youve completed the sign-up process. If you do not sign up before the expiration date, you must request a new code. · [employer] Access Code: [email or web address] Expires: 7/4/2017  3:47 PM 
 
4. Enter the last four digits of your Social Security Number (xxxx) and Date of Birth (mm/dd/yyyy) as indicated and click Submit. You will be taken to the next sign-up page. 5. Create a [employer] ID. This will be your [employer] login ID and cannot be changed, so think of one that is secure and easy to remember. 6. Create a [employer] password. You can change your password at any time. 7. Enter your Password Reset Question and Answer. This can be used at a later time if you forget your password. 8. Enter your e-mail address. You will receive e-mail notification when new information is available in [address]. 9. Click Sign Up. You can now view and download portions of your medical record. 10. Click the Download Summary menu link to download a portable copy of your medical information. If you have questions, please visit the Frequently Asked Questions section of the [employer] website. Remember, [employer] is NOT to be used for urgent needs. For medical emergencies, dial 911. Now available from your iPhone and Android! Please provide this summary of care documentation to your next provider. Your primary care clinician is listed as Mone Butler. If you have any questions after today's visit, please call [phone]. Internal Medicine Internal Medicine

## 2022-12-06 ENCOUNTER — TRANSCRIBE ORDER (OUTPATIENT)
Dept: SCHEDULING | Age: 62
End: 2022-12-06

## 2022-12-06 DIAGNOSIS — Z12.31 SCREENING MAMMOGRAM FOR BREAST CANCER: Primary | ICD-10-CM

## 2022-12-22 ENCOUNTER — HOSPITAL ENCOUNTER (OUTPATIENT)
Dept: MAMMOGRAPHY | Age: 62
Discharge: HOME | End: 2022-12-22
Attending: OBSTETRICS & GYNECOLOGY
Payer: OTHER GOVERNMENT

## 2022-12-22 DIAGNOSIS — Z12.31 SCREENING MAMMOGRAM FOR BREAST CANCER: ICD-10-CM

## 2022-12-22 PROCEDURE — 77063 BREAST TOMOSYNTHESIS BI: CPT

## 2023-05-21 RX ORDER — ALBUTEROL SULFATE 90 UG/1
2 AEROSOL, METERED RESPIRATORY (INHALATION) EVERY 4 HOURS PRN
COMMUNITY
Start: 2017-12-08

## 2023-05-21 RX ORDER — TERBINAFINE HYDROCHLORIDE 250 MG/1
250 TABLET ORAL DAILY
COMMUNITY
Start: 2017-12-08